# Patient Record
Sex: MALE | Race: WHITE | Employment: OTHER | URBAN - METROPOLITAN AREA
[De-identification: names, ages, dates, MRNs, and addresses within clinical notes are randomized per-mention and may not be internally consistent; named-entity substitution may affect disease eponyms.]

---

## 2019-05-22 ENCOUNTER — APPOINTMENT (OUTPATIENT)
Dept: GENERAL RADIOLOGY | Age: 74
End: 2019-05-22
Attending: EMERGENCY MEDICINE
Payer: MEDICARE

## 2019-05-22 ENCOUNTER — HOSPITAL ENCOUNTER (EMERGENCY)
Age: 74
Discharge: HOME | End: 2019-05-22
Attending: EMERGENCY MEDICINE
Payer: MEDICARE

## 2019-05-22 VITALS
WEIGHT: 128 LBS | SYSTOLIC BLOOD PRESSURE: 148 MMHG | OXYGEN SATURATION: 98 % | HEIGHT: 71 IN | BODY MASS INDEX: 17.92 KG/M2 | TEMPERATURE: 98.6 F | HEART RATE: 94 BPM | DIASTOLIC BLOOD PRESSURE: 74 MMHG | RESPIRATION RATE: 20 BRPM

## 2019-05-22 DIAGNOSIS — J18.9 COMMUNITY ACQUIRED PNEUMONIA OF LEFT LOWER LOBE OF LUNG: Primary | ICD-10-CM

## 2019-05-22 DIAGNOSIS — H10.32 ACUTE CONJUNCTIVITIS OF LEFT EYE, UNSPECIFIED ACUTE CONJUNCTIVITIS TYPE: ICD-10-CM

## 2019-05-22 PROBLEM — I25.10 CAD (CORONARY ARTERY DISEASE): Chronic | Status: ACTIVE | Noted: 2019-05-22

## 2019-05-22 PROBLEM — R07.9 CHEST PAIN: Status: ACTIVE | Noted: 2019-05-22

## 2019-05-22 LAB
ALBUMIN SERPL-MCNC: 3.7 G/DL (ref 3.2–4.6)
ALBUMIN/GLOB SERPL: 0.9 {RATIO} (ref 1.2–3.5)
ALP SERPL-CCNC: 69 U/L (ref 50–136)
ALT SERPL-CCNC: 35 U/L (ref 12–65)
ANION GAP SERPL CALC-SCNC: 13 MMOL/L (ref 7–16)
APTT PPP: 25.8 SEC (ref 24.7–39.8)
AST SERPL-CCNC: 44 U/L (ref 15–37)
ATRIAL RATE: 101 BPM
ATRIAL RATE: 98 BPM
BASOPHILS # BLD: 0 K/UL (ref 0–0.2)
BASOPHILS NFR BLD: 1 % (ref 0–2)
BILIRUB SERPL-MCNC: 0.9 MG/DL (ref 0.2–1.1)
BNP SERPL-MCNC: 412 PG/ML
BUN SERPL-MCNC: 11 MG/DL (ref 8–23)
CALCIUM SERPL-MCNC: 9.3 MG/DL (ref 8.3–10.4)
CALCULATED P AXIS, ECG09: 83 DEGREES
CALCULATED P AXIS, ECG09: 85 DEGREES
CALCULATED R AXIS, ECG10: 31 DEGREES
CALCULATED R AXIS, ECG10: 45 DEGREES
CALCULATED T AXIS, ECG11: 52 DEGREES
CALCULATED T AXIS, ECG11: 63 DEGREES
CHLORIDE SERPL-SCNC: 97 MMOL/L (ref 98–107)
CO2 SERPL-SCNC: 23 MMOL/L (ref 21–32)
CREAT SERPL-MCNC: 1.01 MG/DL (ref 0.8–1.5)
D DIMER PPP FEU-MCNC: 0.55 UG/ML(FEU)
DIAGNOSIS, 93000: NORMAL
DIAGNOSIS, 93000: NORMAL
DIFFERENTIAL METHOD BLD: ABNORMAL
EOSINOPHIL # BLD: 0 K/UL (ref 0–0.8)
EOSINOPHIL NFR BLD: 0 % (ref 0.5–7.8)
ERYTHROCYTE [DISTWIDTH] IN BLOOD BY AUTOMATED COUNT: 13.7 % (ref 11.9–14.6)
GLOBULIN SER CALC-MCNC: 4 G/DL (ref 2.3–3.5)
GLUCOSE SERPL-MCNC: 100 MG/DL (ref 65–100)
HCT VFR BLD AUTO: 40.2 % (ref 41.1–50.3)
HGB BLD-MCNC: 13.8 G/DL (ref 13.6–17.2)
IMM GRANULOCYTES # BLD AUTO: 0 K/UL (ref 0–0.5)
IMM GRANULOCYTES NFR BLD AUTO: 0 % (ref 0–5)
INR PPP: 1
LIPASE SERPL-CCNC: 125 U/L (ref 73–393)
LYMPHOCYTES # BLD: 0.7 K/UL (ref 0.5–4.6)
LYMPHOCYTES NFR BLD: 9 % (ref 13–44)
MCH RBC QN AUTO: 35.2 PG (ref 26.1–32.9)
MCHC RBC AUTO-ENTMCNC: 34.3 G/DL (ref 31.4–35)
MCV RBC AUTO: 102.6 FL (ref 79.6–97.8)
MONOCYTES # BLD: 0.7 K/UL (ref 0.1–1.3)
MONOCYTES NFR BLD: 9 % (ref 4–12)
NEUTS SEG # BLD: 6.1 K/UL (ref 1.7–8.2)
NEUTS SEG NFR BLD: 81 % (ref 43–78)
NRBC # BLD: 0 K/UL (ref 0–0.2)
P-R INTERVAL, ECG05: 128 MS
P-R INTERVAL, ECG05: 128 MS
PLATELET # BLD AUTO: 240 K/UL (ref 150–450)
PMV BLD AUTO: 9 FL (ref 9.4–12.3)
POTASSIUM SERPL-SCNC: 3.9 MMOL/L (ref 3.5–5.1)
PROT SERPL-MCNC: 7.7 G/DL (ref 6.3–8.2)
PROTHROMBIN TIME: 13.4 SEC (ref 11.7–14.5)
Q-T INTERVAL, ECG07: 394 MS
Q-T INTERVAL, ECG07: 400 MS
QRS DURATION, ECG06: 104 MS
QRS DURATION, ECG06: 106 MS
QTC CALCULATION (BEZET), ECG08: 510 MS
QTC CALCULATION (BEZET), ECG08: 510 MS
RBC # BLD AUTO: 3.92 M/UL (ref 4.23–5.6)
SODIUM SERPL-SCNC: 133 MMOL/L (ref 136–145)
TROPONIN I BLD-MCNC: 0 NG/ML (ref 0.02–0.05)
TROPONIN I BLD-MCNC: 0.02 NG/ML (ref 0.02–0.05)
VENTRICULAR RATE, ECG03: 101 BPM
VENTRICULAR RATE, ECG03: 98 BPM
WBC # BLD AUTO: 7.5 K/UL (ref 4.3–11.1)

## 2019-05-22 PROCEDURE — 71046 X-RAY EXAM CHEST 2 VIEWS: CPT

## 2019-05-22 PROCEDURE — 74011000258 HC RX REV CODE- 258: Performed by: EMERGENCY MEDICINE

## 2019-05-22 PROCEDURE — 96375 TX/PRO/DX INJ NEW DRUG ADDON: CPT | Performed by: EMERGENCY MEDICINE

## 2019-05-22 PROCEDURE — 85025 COMPLETE CBC W/AUTO DIFF WBC: CPT

## 2019-05-22 PROCEDURE — 85730 THROMBOPLASTIN TIME PARTIAL: CPT

## 2019-05-22 PROCEDURE — 74011250637 HC RX REV CODE- 250/637: Performed by: EMERGENCY MEDICINE

## 2019-05-22 PROCEDURE — 74011250636 HC RX REV CODE- 250/636: Performed by: EMERGENCY MEDICINE

## 2019-05-22 PROCEDURE — 85379 FIBRIN DEGRADATION QUANT: CPT

## 2019-05-22 PROCEDURE — 99285 EMERGENCY DEPT VISIT HI MDM: CPT | Performed by: EMERGENCY MEDICINE

## 2019-05-22 PROCEDURE — 96376 TX/PRO/DX INJ SAME DRUG ADON: CPT | Performed by: EMERGENCY MEDICINE

## 2019-05-22 PROCEDURE — 83880 ASSAY OF NATRIURETIC PEPTIDE: CPT

## 2019-05-22 PROCEDURE — 80053 COMPREHEN METABOLIC PANEL: CPT

## 2019-05-22 PROCEDURE — 93005 ELECTROCARDIOGRAM TRACING: CPT | Performed by: EMERGENCY MEDICINE

## 2019-05-22 PROCEDURE — 84484 ASSAY OF TROPONIN QUANT: CPT

## 2019-05-22 PROCEDURE — 83690 ASSAY OF LIPASE: CPT

## 2019-05-22 PROCEDURE — 85610 PROTHROMBIN TIME: CPT

## 2019-05-22 PROCEDURE — 96365 THER/PROPH/DIAG IV INF INIT: CPT | Performed by: EMERGENCY MEDICINE

## 2019-05-22 PROCEDURE — 36415 COLL VENOUS BLD VENIPUNCTURE: CPT

## 2019-05-22 RX ORDER — DOXYCYCLINE HYCLATE 100 MG
100 TABLET ORAL 2 TIMES DAILY
Qty: 14 TAB | Refills: 0 | Status: SHIPPED | OUTPATIENT
Start: 2019-05-22 | End: 2019-05-29

## 2019-05-22 RX ORDER — ERYTHROMYCIN 5 MG/G
OINTMENT OPHTHALMIC
Status: COMPLETED | OUTPATIENT
Start: 2019-05-22 | End: 2019-05-22

## 2019-05-22 RX ORDER — FAMOTIDINE 10 MG/ML
20 INJECTION INTRAVENOUS
Status: COMPLETED | OUTPATIENT
Start: 2019-05-22 | End: 2019-05-22

## 2019-05-22 RX ORDER — ONDANSETRON 2 MG/ML
4 INJECTION INTRAMUSCULAR; INTRAVENOUS
Status: COMPLETED | OUTPATIENT
Start: 2019-05-22 | End: 2019-05-22

## 2019-05-22 RX ORDER — AZITHROMYCIN 250 MG/1
500 TABLET, FILM COATED ORAL
Status: COMPLETED | OUTPATIENT
Start: 2019-05-22 | End: 2019-05-22

## 2019-05-22 RX ORDER — MORPHINE SULFATE 2 MG/ML
2 INJECTION, SOLUTION INTRAMUSCULAR; INTRAVENOUS
Status: COMPLETED | OUTPATIENT
Start: 2019-05-22 | End: 2019-05-22

## 2019-05-22 RX ADMIN — MORPHINE SULFATE 2 MG: 2 INJECTION, SOLUTION INTRAMUSCULAR; INTRAVENOUS at 03:56

## 2019-05-22 RX ADMIN — MENTHOL, METHYL SALICYLATE: 10; 15 CREAM TOPICAL at 06:19

## 2019-05-22 RX ADMIN — ERYTHROMYCIN: 5 OINTMENT OPHTHALMIC at 05:09

## 2019-05-22 RX ADMIN — FAMOTIDINE 20 MG: 10 INJECTION, SOLUTION INTRAVENOUS at 03:56

## 2019-05-22 RX ADMIN — CEFTRIAXONE SODIUM 1 G: 1 INJECTION, POWDER, FOR SOLUTION INTRAMUSCULAR; INTRAVENOUS at 05:07

## 2019-05-22 RX ADMIN — AZITHROMYCIN 500 MG: 250 TABLET, FILM COATED ORAL at 06:04

## 2019-05-22 RX ADMIN — ONDANSETRON 4 MG: 2 INJECTION INTRAMUSCULAR; INTRAVENOUS at 03:56

## 2019-05-22 RX ADMIN — ONDANSETRON 4 MG: 2 INJECTION INTRAMUSCULAR; INTRAVENOUS at 05:12

## 2019-05-22 NOTE — DISCHARGE INSTRUCTIONS
Apply thin layer of antibiotic ointment into left eye 4 times daily for 5 days. Take all antibiotics. Take next dose tomorrow. Return for worsening or concerning symptoms. Take Motrin as needed for pain.

## 2019-05-22 NOTE — ED NOTES
I have reviewed discharge instructions with the patient. The patient verbalized understanding. Patient left ED via Discharge Method: ambulatory to Home with self. Pt reports he will walk to the bus station and has money. Pt visualized with ambulatory steady gait by this RN. Opportunity for questions and clarification provided. Patient given 1 scripts. To continue your aftercare when you leave the hospital, you may receive an automated call from our care team to check in on how you are doing. This is a free service and part of our promise to provide the best care and service to meet your aftercare needs.  If you have questions, or wish to unsubscribe from this service please call [phone]. Thank you for Choosing our [facility] Emergency Department.

## 2019-05-22 NOTE — ED PROVIDER NOTES
38-year-old male [facility] with central chest pain started around 3 AM.  He states it woke him from sleep. He states it radiates to his back and is pressure-like followed by severe pounding pain. He has had a nonproductive cough with shortness of breath. He reports prior MI, but does not remember when. He was told he needed a low salt diet. He has not eaten in 4 days. He has a few episodes of vomiting. EMS called possible STEMI alert. He is evaluated by cardiology upon arrival and felt not to be criteria for STEMI. He denies any history of DVT or PE. He is quite a poor historian. He reports bilateral prior eye surgery. He has drainage from his left eye and states this is new. He admits to alcohol use. Also admits to prior lung surgery x 2. Chest Pain (Angina)    Associated symptoms include back pain, cough, nausea, shortness of breath and vomiting. Pertinent negatives include no abdominal pain, no fever, no headaches, no palpitations and no weakness. No past medical history on file. No past surgical history on file. No family history on file.     Social History     Socioeconomic History    Marital status:      Spouse name: Not on file    Number of children: Not on file    Years of education: Not on file    Highest education level: Not on file   Occupational History    Not on file   Social Needs    Financial resource strain: Not on file    Food insecurity:     Worry: Not on file     Inability: Not on file    Transportation needs:     Medical: Not on file     Non-medical: Not on file   Tobacco Use    Smoking status: Not on file   Substance and Sexual Activity    Alcohol use: Not on file    Drug use: Not on file    Sexual activity: Not on file   Lifestyle    Physical activity:     Days per week: Not on file     Minutes per session: Not on file    Stress: Not on file   Relationships    Social connections:     Talks on phone: Not on file Gets together: Not on file     Attends Sabianist service: Not on file     Active member of club or organization: Not on file     Attends meetings of clubs or organizations: Not on file     Relationship status: Not on file    Intimate partner violence:     Fear of current or ex partner: Not on file     Emotionally abused: Not on file     Physically abused: Not on file     Forced sexual activity: Not on file   Other Topics Concern    Not on file   Social History Narrative    Not on file         ALLERGIES: Patient has no known allergies. Review of Systems   Constitutional: Positive for fatigue. Negative for chills and fever. HENT: Negative for hearing loss. Eyes: Positive for discharge. Negative for visual disturbance. Respiratory: Positive for cough and shortness of breath. Cardiovascular: Positive for chest pain. Negative for palpitations. Gastrointestinal: Positive for nausea and vomiting. Negative for abdominal pain and diarrhea. Musculoskeletal: Positive for back pain. Skin: Negative for rash. Neurological: Negative for weakness and headaches. Psychiatric/Behavioral: Negative for confusion. Vitals:    05/22/19 0344 05/22/19 0345 05/22/19 0347   BP: 116/75  116/75   Pulse:  (!) 102 94   Resp:  (!) 41 22   SpO2:  94% 92%   Weight:   58.1 kg (128 lb)   Height:   5' 11\" (1.803 m)            Physical Exam   Constitutional: He appears well-developed and well-nourished. Poorly groomed, cachectic   HENT:   Head: Normocephalic and atraumatic. Eyes: Pupils are equal, round, and reactive to light. EOM are normal. Left eye exhibits discharge. Neck: Normal range of motion. Neck supple. Cardiovascular: Regular rhythm and normal heart sounds. Pulmonary/Chest: Effort normal. He has decreased breath sounds in the left middle field and the left lower field. Abdominal: Soft. There is no tenderness. Musculoskeletal: Normal range of motion. Neurological: He is alert.    Skin: Skin is warm and dry. Psychiatric: His behavior is normal.   Nursing note and vitals reviewed. MDM  Number of Diagnoses or Management Options  Diagnosis management comments: Parts of this document were created using dragon voice recognition software. The chart has been reviewed but errors may still be present. Atypical cp worse with breathing and palpation. Infiltrate on CXR. Will check repeat trop. 6:15 AM  No evolving changes on EKG. Second troponin negative. No suspicion for coronary etiology. Treated for pneumonia and conjunctivitis     I discussed the results of all labs, procedures, radiographs, and treatments with the patient and available family. Treatment plan is agreed upon and the patient is ready for discharge. Questions about treatment in the ED and differential diagnosis of presenting condition were answered. Patient was given verbal discharge instructions including, but not limited to, importance of returning to the emergency department for any concern of worsening or continued symptoms. Instructions were given to follow up with a primary care provider or specialist within 1-2 days. Adverse effects of medications, if prescribed, were discussed and patient was advised to refrain from significant physical activity until followed up by primary care physician and to not drive or operate heavy machinery after taking any sedating substances.            Amount and/or Complexity of Data Reviewed  Clinical lab tests: reviewed and ordered (Results for orders placed or performed during the hospital encounter of 05/22/19  -CBC WITH AUTOMATED DIFF       Result                      Value             Ref Range           WBC                         7.5               4.3 - 11.1 K*       RBC                         3.92 (L)          4.23 - 5.6 M*       HGB                         13.8              13.6 - 17.2 *       HCT                         40.2 (L)          41.1 - 50.3 %       MCV 102.6 (H)         79.6 - 97.8 *       MCH                         35.2 (H)          26.1 - 32.9 *       MCHC                        34.3              31.4 - 35.0 *       RDW                         13.7              11.9 - 14.6 %       PLATELET                    240               150 - 450 K/*       MPV                         9.0 (L)           9.4 - 12.3 FL       ABSOLUTE NRBC               0.00              0.0 - 0.2 K/*       DF                          AUTOMATED                             NEUTROPHILS                 81 (H)            43 - 78 %           LYMPHOCYTES                 9 (L)             13 - 44 %           MONOCYTES                   9                 4.0 - 12.0 %        EOSINOPHILS                 0 (L)             0.5 - 7.8 %         BASOPHILS                   1                 0.0 - 2.0 %         IMMATURE GRANULOCYTES       0                 0.0 - 5.0 %         ABS. NEUTROPHILS            6.1               1.7 - 8.2 K/*       ABS. LYMPHOCYTES            0.7               0.5 - 4.6 K/*       ABS. MONOCYTES              0.7               0.1 - 1.3 K/*       ABS. EOSINOPHILS            0.0               0.0 - 0.8 K/*       ABS. BASOPHILS              0.0               0.0 - 0.2 K/*       ABS. IMM.  GRANS.            0.0               0.0 - 0.5 K/*  -METABOLIC PANEL, COMPREHENSIVE       Result                      Value             Ref Range           Sodium                      133 (L)           136 - 145 mm*       Potassium                   3.9               3.5 - 5.1 mm*       Chloride                    97 (L)            98 - 107 mmo*       CO2                         23                21 - 32 mmol*       Anion gap                   13                7 - 16 mmol/L       Glucose                     100               65 - 100 mg/*       BUN                         11                8 - 23 MG/DL        Creatinine                  1.01              0.8 - 1.5 MG*       GFR est AA >60               >60 ml/min/1*       GFR est non-AA              >60               >60 ml/min/1*       Calcium                     9.3               8.3 - 10.4 M*       Bilirubin, total            0.9               0.2 - 1.1 MG*       ALT (SGPT)                  35                12 - 65 U/L         AST (SGOT)                  44 (H)            15 - 37 U/L         Alk. phosphatase            69                50 - 136 U/L        Protein, total              7.7               6.3 - 8.2 g/*       Albumin                     3.7               3.2 - 4.6 g/*       Globulin                    4.0 (H)           2.3 - 3.5 g/*       A-G Ratio                   0.9 (L)           1.2 - 3.5      -D DIMER       Result                      Value             Ref Range           D DIMER                     0.55              <0.56 ug/ml(*  -PTT       Result                      Value             Ref Range           aPTT                        25.8              24.7 - 39.8 *  -PROTHROMBIN TIME + INR       Result                      Value             Ref Range           Prothrombin time            13.4              11.7 - 14.5 *       INR                         1.0                              -LIPASE       Result                      Value             Ref Range           Lipase                      125               73 - 393 U/L   -POC TROPONIN-I       Result                      Value             Ref Range           Troponin-I (POC)            0.02              0.02 - 0.05 *  )  Tests in the radiology section of CPT®: ordered and reviewed (Xr Chest Pa Lat    Result Date: 5/22/2019  EXAM: Chest x-ray. INDICATION: Chest pain. COMPARISON: None. TECHNIQUE: Frontal and lateral views of the chest were obtained. FINDINGS: There is mild atelectasis or infiltrate in the medial left lung base. The right lung is clear. The cardiac size, mediastinal contour and pulmonary vasculature are within normal limits.  No pneumothorax or pleural effusion is seen. There is an old healed right clavicle fracture.      Mild left lung base atelectasis or pneumonia.     )  Tests in the medicine section of CPT®: ordered and reviewed           Procedures

## 2019-07-18 ENCOUNTER — APPOINTMENT (OUTPATIENT)
Dept: GENERAL RADIOLOGY | Age: 74
End: 2019-07-18
Attending: EMERGENCY MEDICINE
Payer: MEDICARE

## 2019-07-18 ENCOUNTER — HOSPITAL ENCOUNTER (EMERGENCY)
Age: 74
Discharge: LEFT BEFORE DISCHARGE | End: 2019-07-18
Attending: EMERGENCY MEDICINE
Payer: MEDICARE

## 2019-07-18 VITALS
OXYGEN SATURATION: 100 % | DIASTOLIC BLOOD PRESSURE: 74 MMHG | HEIGHT: 71 IN | HEART RATE: 76 BPM | RESPIRATION RATE: 18 BRPM | BODY MASS INDEX: 17.64 KG/M2 | SYSTOLIC BLOOD PRESSURE: 126 MMHG | WEIGHT: 126 LBS | TEMPERATURE: 97.8 F

## 2019-07-18 LAB
ALBUMIN SERPL-MCNC: 3 G/DL (ref 3.2–4.6)
ALBUMIN/GLOB SERPL: 1 {RATIO} (ref 1.2–3.5)
ALP SERPL-CCNC: 76 U/L (ref 50–136)
ALT SERPL-CCNC: 48 U/L (ref 12–65)
ANION GAP SERPL CALC-SCNC: 6 MMOL/L (ref 7–16)
AST SERPL-CCNC: 53 U/L (ref 15–37)
BASOPHILS # BLD: 0.1 K/UL (ref 0–0.2)
BASOPHILS NFR BLD: 1 % (ref 0–2)
BILIRUB SERPL-MCNC: 0.3 MG/DL (ref 0.2–1.1)
BNP SERPL-MCNC: 353 PG/ML
BUN SERPL-MCNC: 18 MG/DL (ref 8–23)
CALCIUM SERPL-MCNC: 8.4 MG/DL (ref 8.3–10.4)
CHLORIDE SERPL-SCNC: 106 MMOL/L (ref 98–107)
CO2 SERPL-SCNC: 27 MMOL/L (ref 21–32)
CREAT SERPL-MCNC: 0.96 MG/DL (ref 0.8–1.5)
DIFFERENTIAL METHOD BLD: ABNORMAL
EOSINOPHIL # BLD: 0.1 K/UL (ref 0–0.8)
EOSINOPHIL NFR BLD: 1 % (ref 0.5–7.8)
ERYTHROCYTE [DISTWIDTH] IN BLOOD BY AUTOMATED COUNT: 14.2 % (ref 11.9–14.6)
GLOBULIN SER CALC-MCNC: 3 G/DL (ref 2.3–3.5)
GLUCOSE SERPL-MCNC: 89 MG/DL (ref 65–100)
HCT VFR BLD AUTO: 21.5 % (ref 41.1–50.3)
HGB BLD-MCNC: 7.1 G/DL (ref 13.6–17.2)
IMM GRANULOCYTES # BLD AUTO: 0 K/UL (ref 0–0.5)
IMM GRANULOCYTES NFR BLD AUTO: 0 % (ref 0–5)
LYMPHOCYTES # BLD: 1 K/UL (ref 0.5–4.6)
LYMPHOCYTES NFR BLD: 10 % (ref 13–44)
MCH RBC QN AUTO: 35.5 PG (ref 26.1–32.9)
MCHC RBC AUTO-ENTMCNC: 33 G/DL (ref 31.4–35)
MCV RBC AUTO: 107.5 FL (ref 79.6–97.8)
MONOCYTES # BLD: 0.8 K/UL (ref 0.1–1.3)
MONOCYTES NFR BLD: 8 % (ref 4–12)
NEUTS SEG # BLD: 7.7 K/UL (ref 1.7–8.2)
NEUTS SEG NFR BLD: 80 % (ref 43–78)
NRBC # BLD: 0 K/UL (ref 0–0.2)
PLATELET # BLD AUTO: 298 K/UL (ref 150–450)
PMV BLD AUTO: 8.4 FL (ref 9.4–12.3)
POTASSIUM SERPL-SCNC: 4.8 MMOL/L (ref 3.5–5.1)
PROT SERPL-MCNC: 6 G/DL (ref 6.3–8.2)
RBC # BLD AUTO: 2 M/UL (ref 4.23–5.6)
SODIUM SERPL-SCNC: 139 MMOL/L (ref 136–145)
WBC # BLD AUTO: 9.6 K/UL (ref 4.3–11.1)

## 2019-07-18 PROCEDURE — 85025 COMPLETE CBC W/AUTO DIFF WBC: CPT

## 2019-07-18 PROCEDURE — 71046 X-RAY EXAM CHEST 2 VIEWS: CPT

## 2019-07-18 PROCEDURE — 83880 ASSAY OF NATRIURETIC PEPTIDE: CPT

## 2019-07-18 PROCEDURE — 99281 EMR DPT VST MAYX REQ PHY/QHP: CPT | Performed by: EMERGENCY MEDICINE

## 2019-07-18 PROCEDURE — 80053 COMPREHEN METABOLIC PANEL: CPT

## 2019-07-18 NOTE — ED TRIAGE NOTES
EMS called to store for swollen feet bilaterally. States they have been swollen for the last 4 days. Pitting edema. bp 125/71, hr 69, 99% RA, bgl 128.

## 2019-07-18 NOTE — LETTER
7/19/2019 Jaleesa Brooks [facility] [address] Dear Melanie Mojgan Kong, You were recently seen in the Emergency Department of [facility] and had blood work or x-rays performed. We would like to discuss these with you. Please call the Emergency Department at your earliest convenience. If you were seen at [facility], please dial [phone], and if you were seen at [facility], please call [phone] to speak with one of our providers. Sincerely, Jenna Wynne MD 
Medical Director Emergency Services, [address] [place] [address]  
[phone]/ [phone]

## 2019-07-18 NOTE — ED PROVIDER NOTES
Patient is a 73 yo male with leg swelling. States swelling in his legs for the past 4 days and states so bad it is difficult due to the pain from the swelling. Denies any chest pain or SOB, no abdominal pain, no fevers or chills, no further complaints. States he has history of heart failure however not taking any lasix. Patient seen by me briefly in triage to begin workup until further evaluation and management by another provider once a room becomes available. No past medical history on file. No past surgical history on file. No family history on file.     Social History     Socioeconomic History    Marital status:      Spouse name: Not on file    Number of children: Not on file    Years of education: Not on file    Highest education level: Not on file   Occupational History    Not on file   Social Needs    Financial resource strain: Not on file    Food insecurity:     Worry: Not on file     Inability: Not on file    Transportation needs:     Medical: Not on file     Non-medical: Not on file   Tobacco Use    Smoking status: Not on file   Substance and Sexual Activity    Alcohol use: Not on file    Drug use: Not on file    Sexual activity: Not on file   Lifestyle    Physical activity:     Days per week: Not on file     Minutes per session: Not on file    Stress: Not on file   Relationships    Social connections:     Talks on phone: Not on file     Gets together: Not on file     Attends Druze service: Not on file     Active member of club or organization: Not on file     Attends meetings of clubs or organizations: Not on file     Relationship status: Not on file    Intimate partner violence:     Fear of current or ex partner: Not on file     Emotionally abused: Not on file     Physically abused: Not on file     Forced sexual activity: Not on file   Other Topics Concern    Not on file   Social History Narrative    Not on file         ALLERGIES: Patient has no known allergies. Review of Systems    There were no vitals filed for this visit.          Physical Exam     MDM       Procedures

## 2019-07-22 NOTE — ED NOTES
Patient's family member, Sue Tse, niece called back after receiving letter. He is not having any new symptoms. She will bring him back for further evaluation. His Hgb/Hct was 1/2 was it was in 05/2019. He needs further evaluation.

## 2022-04-25 NOTE — CONSULTS
[facility] Cardiology Consult                Date of  Admission: 5/22/2019  3:41 AM     Primary Care Physician: None  Primary Cardiologist: None  Referring Physician:  ER MD  Consulting Physician: Dr. Lino Ross    CC/Reason for consult: chest pain       Shama Francis is a 74 y.o. male with past medical history of CAD with reported MI in the past who presented to the ER via EMS with complaints of chest pain. Patient reports that his pain woke him from sleep. He reports \"chest pain all the time after my heart attack\" but states tonight his pain was worse. Upon EMS arrival, EKG showed ST elevation in AVR alone with LVH. He was given 324mg ASA and SL nitro en route to the ER. Upon arrival to the ER, he continued to complain of chest pain. Repeat EKG showed no new acute changes when compared to EMS EKG. Initial troponin is negative. During exam, pain is reproducible when push over left chest wall. Appears to be hyperventilating as well. Review of Systems   Respiratory: Positive for shortness of breath. Cardiovascular: Positive for chest pain. Gastrointestinal: Positive for nausea. All other systems reviewed and are negative. Physical Exam  Vitals:    05/22/19 0344 05/22/19 0345 05/22/19 0347   BP: 116/75  116/75   Pulse:  (!) 102 94   Resp:  (!) 41 22   SpO2:  94% 92%   Weight:   58.1 kg (128 lb)   Height:   5' 11\" (1.803 m)       Physical Exam:  Physical Exam   Constitutional: He is oriented to person, place, and time and well-developed, well-nourished, and in no distress. Cardiovascular: Regular rhythm. Tachycardia present. Pulmonary/Chest: Effort normal and breath sounds normal.   Abdominal: Soft. Bowel sounds are normal.   Musculoskeletal: Normal range of motion. He exhibits no edema. Neurological: He is alert and oriented to person, place, and time. Skin: Skin is warm and dry.    Psychiatric: Affect normal.       Cardiographics    Telemetry: normal sinus rhythm  ECG: sinus tachycardia  Echocardiogram: Not done    Labs:   Recent Labs     05/22/19  0349   WBC 7.5   HGB 13.8   HCT 40.2*           Assessment/Plan:     Assessment:      Chest pain -- appears to reproducible in nature. ER to repeat EKG 10 minutes after arrival. No emergent need for the cath lab at this time. ER MD to evaluate. CAD -- reports MI in the past. Per PTA list, on no medications as outpatient. Thank you very much for this referral. We appreciate the opportunity to participate in this patient's care. We will follow along with above stated plan.     Mike Miles NP  Consulting MD: [id number] [facility]